# Patient Record
Sex: MALE | Race: WHITE | NOT HISPANIC OR LATINO | Employment: UNEMPLOYED | URBAN - METROPOLITAN AREA
[De-identification: names, ages, dates, MRNs, and addresses within clinical notes are randomized per-mention and may not be internally consistent; named-entity substitution may affect disease eponyms.]

---

## 2021-09-14 ENCOUNTER — OFFICE VISIT (OUTPATIENT)
Dept: INTERNAL MEDICINE | Facility: CLINIC | Age: 46
End: 2021-09-14

## 2021-09-14 ENCOUNTER — LAB (OUTPATIENT)
Dept: LAB | Facility: HOSPITAL | Age: 46
End: 2021-09-14

## 2021-09-14 VITALS
TEMPERATURE: 97.1 F | HEIGHT: 70 IN | DIASTOLIC BLOOD PRESSURE: 66 MMHG | SYSTOLIC BLOOD PRESSURE: 110 MMHG | HEART RATE: 59 BPM | WEIGHT: 206.2 LBS | OXYGEN SATURATION: 99 % | BODY MASS INDEX: 29.52 KG/M2

## 2021-09-14 DIAGNOSIS — F19.11 HISTORY OF INTRAVENOUS DRUG ABUSE (HCC): ICD-10-CM

## 2021-09-14 DIAGNOSIS — R73.03 PREDIABETES: ICD-10-CM

## 2021-09-14 DIAGNOSIS — F32.A DEPRESSION, UNSPECIFIED DEPRESSION TYPE: ICD-10-CM

## 2021-09-14 DIAGNOSIS — K74.60 CIRRHOSIS OF LIVER WITH ASCITES, UNSPECIFIED HEPATIC CIRRHOSIS TYPE (HCC): Primary | ICD-10-CM

## 2021-09-14 DIAGNOSIS — Z23 IMMUNIZATION DUE: ICD-10-CM

## 2021-09-14 DIAGNOSIS — R18.8 CIRRHOSIS OF LIVER WITH ASCITES, UNSPECIFIED HEPATIC CIRRHOSIS TYPE (HCC): Primary | ICD-10-CM

## 2021-09-14 PROBLEM — Z87.898 HISTORY OF INTRAVENOUS DRUG ABUSE: Status: ACTIVE | Noted: 2021-09-14

## 2021-09-14 LAB
BASOPHILS # BLD AUTO: 0.03 10*3/MM3 (ref 0–0.2)
BASOPHILS NFR BLD AUTO: 0.7 % (ref 0–1.5)
DEPRECATED RDW RBC AUTO: 43.1 FL (ref 37–54)
EOSINOPHIL # BLD AUTO: 0.14 10*3/MM3 (ref 0–0.4)
EOSINOPHIL NFR BLD AUTO: 3.1 % (ref 0.3–6.2)
ERYTHROCYTE [DISTWIDTH] IN BLOOD BY AUTOMATED COUNT: 14 % (ref 12.3–15.4)
HBA1C MFR BLD: 4.9 % (ref 4.8–5.6)
HCT VFR BLD AUTO: 35.5 % (ref 37.5–51)
HGB BLD-MCNC: 11.9 G/DL (ref 13–17.7)
LYMPHOCYTES # BLD AUTO: 1.03 10*3/MM3 (ref 0.7–3.1)
LYMPHOCYTES NFR BLD AUTO: 23.1 % (ref 19.6–45.3)
MCH RBC QN AUTO: 28.7 PG (ref 26.6–33)
MCHC RBC AUTO-ENTMCNC: 33.5 G/DL (ref 31.5–35.7)
MCV RBC AUTO: 85.5 FL (ref 79–97)
MONOCYTES # BLD AUTO: 0.38 10*3/MM3 (ref 0.1–0.9)
MONOCYTES NFR BLD AUTO: 8.5 % (ref 5–12)
NEUTROPHILS NFR BLD AUTO: 2.87 10*3/MM3 (ref 1.7–7)
NEUTROPHILS NFR BLD AUTO: 64.4 % (ref 42.7–76)
PLATELET # BLD AUTO: 76 10*3/MM3 (ref 140–450)
PMV BLD AUTO: 10.3 FL (ref 6–12)
RBC # BLD AUTO: 4.15 10*6/MM3 (ref 4.14–5.8)
WBC # BLD AUTO: 4.46 10*3/MM3 (ref 3.4–10.8)

## 2021-09-14 PROCEDURE — 83036 HEMOGLOBIN GLYCOSYLATED A1C: CPT | Performed by: STUDENT IN AN ORGANIZED HEALTH CARE EDUCATION/TRAINING PROGRAM

## 2021-09-14 PROCEDURE — 80061 LIPID PANEL: CPT | Performed by: STUDENT IN AN ORGANIZED HEALTH CARE EDUCATION/TRAINING PROGRAM

## 2021-09-14 PROCEDURE — 85025 COMPLETE CBC W/AUTO DIFF WBC: CPT | Performed by: STUDENT IN AN ORGANIZED HEALTH CARE EDUCATION/TRAINING PROGRAM

## 2021-09-14 PROCEDURE — 99214 OFFICE O/P EST MOD 30 MIN: CPT | Performed by: STUDENT IN AN ORGANIZED HEALTH CARE EDUCATION/TRAINING PROGRAM

## 2021-09-14 PROCEDURE — 99386 PREV VISIT NEW AGE 40-64: CPT | Performed by: STUDENT IN AN ORGANIZED HEALTH CARE EDUCATION/TRAINING PROGRAM

## 2021-09-14 PROCEDURE — 80053 COMPREHEN METABOLIC PANEL: CPT | Performed by: STUDENT IN AN ORGANIZED HEALTH CARE EDUCATION/TRAINING PROGRAM

## 2021-09-14 RX ORDER — MIRTAZAPINE 15 MG/1
15 TABLET, FILM COATED ORAL NIGHTLY
COMMUNITY
End: 2021-09-14 | Stop reason: SDUPTHER

## 2021-09-14 RX ORDER — SPIRONOLACTONE 100 MG/1
100 TABLET, FILM COATED ORAL 2 TIMES DAILY
COMMUNITY
End: 2021-09-14 | Stop reason: SDUPTHER

## 2021-09-14 RX ORDER — BUPRENORPHINE HYDROCHLORIDE AND NALOXONE HYDROCHLORIDE DIHYDRATE 8; 2 MG/1; MG/1
8 TABLET SUBLINGUAL 2 TIMES DAILY
COMMUNITY
Start: 2021-09-07 | End: 2022-02-18

## 2021-09-14 RX ORDER — LACTULOSE 10 G/15ML
SOLUTION ORAL
COMMUNITY
Start: 2021-09-13 | End: 2022-02-18

## 2021-09-14 RX ORDER — SPIRONOLACTONE 100 MG/1
100 TABLET, FILM COATED ORAL 2 TIMES DAILY
Qty: 60 TABLET | Refills: 1 | Status: SHIPPED | OUTPATIENT
Start: 2021-09-14 | End: 2022-02-18 | Stop reason: SDUPTHER

## 2021-09-14 RX ORDER — FUROSEMIDE 20 MG/1
20 TABLET ORAL 2 TIMES DAILY
COMMUNITY
End: 2021-09-14 | Stop reason: SDUPTHER

## 2021-09-14 RX ORDER — FUROSEMIDE 20 MG/1
20 TABLET ORAL 2 TIMES DAILY
Qty: 60 TABLET | Refills: 1 | Status: SHIPPED | OUTPATIENT
Start: 2021-09-14 | End: 2022-02-18 | Stop reason: SDUPTHER

## 2021-09-14 RX ORDER — MIRTAZAPINE 15 MG/1
15 TABLET, FILM COATED ORAL NIGHTLY
Qty: 30 TABLET | Refills: 1 | Status: SHIPPED | OUTPATIENT
Start: 2021-09-14 | End: 2021-09-20

## 2021-09-14 RX ORDER — FEEDER CONTAINER WITH PUMP SET
1 EACH MISCELLANEOUS 2 TIMES DAILY
Qty: 60 EACH | Refills: 3 | Status: SHIPPED | OUTPATIENT
Start: 2021-09-14

## 2021-09-14 NOTE — PROGRESS NOTES
Chief Complaint  Establish Care and Med Refill (metformin)    Mark Anthony Hooper presents to [facility]      Subjective   Patient is a 45-year-old male with past medical history significant for hepatitis liver cirrhosis, history of IV drug use, prediabetes who presents to clinic to establish care and for GI referral.    Hepatitis:  Diagnosed with hepatitis C approximately 1 year ago.  Treated.  Receives his care through [facility] as well as [place] where he lived previously.  He has had multiple paracentesis done.  Last one was about 1 week ago at the  ER.  He states that he has had about 3 tabs in the last month.  He is currently on Lasix, spironolactone, lactulose.  Remote history of drinking, however has been sober for many years.     H/O IV drug use:  Currently on Suboxone.  Follows up with Suboxone clinic every 2 weeks.    Pre-diabetes:  Per patient has taken metformin in the past.  Does not know his last A1c.      The following portions of the patient's history were reviewed and updated as appropriate: allergies, current medications, past family history, past medical history, past social history, past surgical history and problem list.      Health Maintenance   Topic Date Due   • ANNUAL PHYSICAL  Never done   • TDAP/TD VACCINES (1 - Tdap) Never done   • HEPATITIS C SCREENING  Never done   • INFLUENZA VACCINE  10/01/2021   • COVID-19 Vaccine  Completed   • Pneumococcal Vaccine 0-64  Aged Out         Procedures       Past Medical History:   Diagnosis Date   • Depression    • Diabetes mellitus (CMS/HCC)    • Headache    • Hypertension    • Visual impairment       No Known Allergies   Social History     Tobacco Use   • Smoking status: Former Smoker     Packs/day: 1.00     Years: 25.00     Pack years: 25.00     Quit date:      Years since quittin.7   • Smokeless tobacco: Never Used   Vaping Use   • Vaping Use: Every day   Substance Use Topics   • Alcohol use: Not Currently   •  "Drug use: Not on file     Past Surgical History:   Procedure Laterality Date   • GALLBLADDER SURGERY  1994   • PARACENTESIS  2021      History reviewed. No pertinent family history.      Current Outpatient Medications:   •  buprenorphine-naloxone (SUBOXONE) 8-2 MG per SL tablet, Place 8 tablets under the tongue 2 (two) times a day., Disp: , Rfl:   •  furosemide (LASIX) 20 MG tablet, Take 1 tablet by mouth 2 (Two) Times a Day., Disp: 60 tablet, Rfl: 1  •  lactulose (CHRONULAC) 10 GM/15ML solution, , Disp: , Rfl:   •  mirtazapine (REMERON) 15 MG tablet, Take 1 tablet by mouth Every Night., Disp: 30 tablet, Rfl: 1  •  spironolactone (ALDACTONE) 100 MG tablet, Take 1 tablet by mouth 2 (two) times a day., Disp: 60 tablet, Rfl: 1  •  Nutritional Supplements (Ensure High Protein) liquid, Take 1 bottle by mouth 2 (two) times a day., Disp: 60 each, Rfl: 3    Objective   Vital Signs  /66   Pulse 59   Temp 97.1 °F (36.2 °C) (Temporal)   Ht 177.8 cm (70\")   Wt 93.5 kg (206 lb 3.2 oz)   SpO2 99%   BMI 29.59 kg/m²   Body mass index is 29.59 kg/m².     Physical Exam  Vitals and nursing note reviewed.   Constitutional:       General: He is not in acute distress.     Appearance: Normal appearance. He is not toxic-appearing.   HENT:      Mouth/Throat:      Mouth: Mucous membranes are moist.      Pharynx: Oropharynx is clear.   Eyes:      General: No scleral icterus.  Cardiovascular:      Rate and Rhythm: Normal rate and regular rhythm.      Heart sounds: No murmur heard.     Pulmonary:      Effort: Pulmonary effort is normal.      Breath sounds: Normal breath sounds.   Abdominal:      General: Bowel sounds are normal. There is distension.      Palpations: There is no fluid wave.      Tenderness: There is no abdominal tenderness.      Comments: Striae noted on abdomen   Musculoskeletal:      Right lower leg: Edema (1+) present.      Left lower leg: Edema (1+) present.   Neurological:      Mental Status: He is alert and " oriented to person, place, and time. Mental status is at baseline.          Assessment and Plan  Diagnoses and all orders for this visit:    1. Cirrhosis of liver with ascites, unspecified hepatic cirrhosis type (CMS/HCC) (Primary)  Assessment & Plan:  Secondary to hepatitis C which was treated per patient.  Patient is hemodynamically stable present.  Mental status at baseline.  -Continue Lasix, spironolactone, lactulose  -GI referral sent today  -Patient is aware of signs and symptoms that warrant ED evaluation      Orders:  -     Ambulatory Referral to Gastroenterology  -     CBC Auto Differential; Future  -     Comprehensive Metabolic Panel; Future  -     CBC Auto Differential  -     Comprehensive Metabolic Panel  -     Lipid Panel    2. History of intravenous drug abuse (CMS/HCC)  Assessment & Plan:  On Suboxone      3. Prediabetes  -     Lipid Panel; Future  -     Hemoglobin A1c; Future  -     Hemoglobin A1c    4. Depression, unspecified depression type  Comments:  Continue Remeron    5. Immunization due  Comments:  Per patient up-to-date on immunizations.  Will get release of information from prior clinic.    Other orders  -     Nutritional Supplements (Ensure High Protein) liquid; Take 1 bottle by mouth 2 (two) times a day.  Dispense: 60 each; Refill: 3  -     furosemide (LASIX) 20 MG tablet; Take 1 tablet by mouth 2 (Two) Times a Day.  Dispense: 60 tablet; Refill: 1  -     mirtazapine (REMERON) 15 MG tablet; Take 1 tablet by mouth Every Night.  Dispense: 30 tablet; Refill: 1  -     spironolactone (ALDACTONE) 100 MG tablet; Take 1 tablet by mouth 2 (two) times a day.  Dispense: 60 tablet; Refill: 1           Counseling was given to patient for the following topics:  appropriate exercise, healthy eating habits, disease prevention, importance of immunizations, including risks and benefits, bone health, safe sex and risks of marijuana and other illicit drugs. Also discussed the importance of regular dental and  vision care, as well recommendation for a yearly screening skin exam after age 40.  Written information provided to patient on these topics and other health maintenance issues.    Follow Up    Return in about 1 year (around 9/14/2022), or if symptoms worsen or fail to improve.    Patient was given instructions and counseling regarding his condition or for health maintenance advice. Please see specific information pulled into the AVS if appropriate.    Electronically signed by:   Rasheed Rust MD  09/14/2021

## 2021-09-14 NOTE — ASSESSMENT & PLAN NOTE
Secondary to hepatitis C which was treated per patient.  Patient is hemodynamically stable present.  Mental status at baseline.  -Continue Lasix, spironolactone, lactulose  -GI referral sent today  -Patient is aware of signs and symptoms that warrant ED evaluation

## 2021-09-15 LAB
ALBUMIN SERPL-MCNC: 3.2 G/DL (ref 3.5–5.2)
ALBUMIN/GLOB SERPL: 0.9 G/DL
ALP SERPL-CCNC: 144 U/L (ref 39–117)
ALT SERPL W P-5'-P-CCNC: 44 U/L (ref 1–41)
ANION GAP SERPL CALCULATED.3IONS-SCNC: 8.2 MMOL/L (ref 5–15)
AST SERPL-CCNC: 100 U/L (ref 1–40)
BILIRUB SERPL-MCNC: 0.7 MG/DL (ref 0–1.2)
BUN SERPL-MCNC: 14 MG/DL (ref 6–20)
BUN/CREAT SERPL: 18.7 (ref 7–25)
CALCIUM SPEC-SCNC: 8.3 MG/DL (ref 8.6–10.5)
CHLORIDE SERPL-SCNC: 101 MMOL/L (ref 98–107)
CHOLEST SERPL-MCNC: 143 MG/DL (ref 0–200)
CO2 SERPL-SCNC: 26.8 MMOL/L (ref 22–29)
CREAT SERPL-MCNC: 0.75 MG/DL (ref 0.76–1.27)
GFR SERPL CREATININE-BSD FRML MDRD: 113 ML/MIN/1.73
GLOBULIN UR ELPH-MCNC: 3.6 GM/DL
GLUCOSE SERPL-MCNC: 220 MG/DL (ref 65–99)
HDLC SERPL-MCNC: 50 MG/DL (ref 40–60)
LDLC SERPL CALC-MCNC: 80 MG/DL (ref 0–100)
LDLC/HDLC SERPL: 1.61 {RATIO}
POTASSIUM SERPL-SCNC: 4.5 MMOL/L (ref 3.5–5.2)
PROT SERPL-MCNC: 6.8 G/DL (ref 6–8.5)
SODIUM SERPL-SCNC: 136 MMOL/L (ref 136–145)
TRIGL SERPL-MCNC: 63 MG/DL (ref 0–150)
VLDLC SERPL-MCNC: 13 MG/DL (ref 5–40)

## 2021-09-20 ENCOUNTER — OFFICE VISIT (OUTPATIENT)
Dept: GASTROENTEROLOGY | Facility: CLINIC | Age: 46
End: 2021-09-20

## 2021-09-20 VITALS
WEIGHT: 202.8 LBS | TEMPERATURE: 96.9 F | HEART RATE: 63 BPM | DIASTOLIC BLOOD PRESSURE: 70 MMHG | SYSTOLIC BLOOD PRESSURE: 123 MMHG | BODY MASS INDEX: 29.1 KG/M2

## 2021-09-20 DIAGNOSIS — D69.59 THROMBOCYTOPENIA DUE TO HYPERSPLENISM: ICD-10-CM

## 2021-09-20 DIAGNOSIS — D73.1 THROMBOCYTOPENIA DUE TO HYPERSPLENISM: ICD-10-CM

## 2021-09-20 DIAGNOSIS — R16.1 SPLENOMEGALY: ICD-10-CM

## 2021-09-20 DIAGNOSIS — K76.6 PORTAL HYPERTENSION (HCC): ICD-10-CM

## 2021-09-20 DIAGNOSIS — R18.8 OTHER ASCITES: ICD-10-CM

## 2021-09-20 DIAGNOSIS — I85.10 SECONDARY ESOPHAGEAL VARICES WITHOUT BLEEDING (HCC): Primary | ICD-10-CM

## 2021-09-20 PROCEDURE — 99204 OFFICE O/P NEW MOD 45 MIN: CPT | Performed by: INTERNAL MEDICINE

## 2021-09-20 RX ORDER — NADOLOL 40 MG/1
80 TABLET ORAL
COMMUNITY
Start: 2021-05-11 | End: 2021-09-22 | Stop reason: SDUPTHER

## 2021-09-20 NOTE — PROGRESS NOTES
GASTROENTEROLOGY OFFICE NOTE  Mark Anthony Hooper  [id number]  1975      Chief Complaint   Patient presents with   • Cirrhosis        HISTORY OF PRESENT ILLNESS:  First visit for this very pleasant 45-year-old white male who is accompanied by his mother.  She engineered this visit.  I used to take care of her sister/patient's aunt.    He has chronic hepatitis C related cirrhosis with esophageal varices and secondary ascites as well as splenomegaly and resulting thrombocytopenia but without history of encephalopathy.  His varices have been banded to obliteration.  Last 1 was done at [facility] by Dr. Humble Whitaker about a year ago.  He had one in [place] about 2 years ago.  He has a failed attempt at a TIPS procedure in [facility].    He presents today because of his frustration with the [facility] system and inability to rapidly address worsening ascites and need for paracentesis.  All that said, he has resumed his salt poor diet and protein drinks and currently is not having problems with refractory ascites.  His diuretic regimen is consistent of Lasix 40 mg/day and Aldactone 200 mg/day with normal BUN, creatinine and electrolytes on his September 14 lab.    He is here today without dysphagia, odynophagia, early satiety, unexplained weight loss, melena or bright red blood per rectum.  He does feel particularly fatigued.  He states that Adderall helped with this which was administered/prescribed to him by the Suboxone clinic he was attending in [place].    He had been living in [place] but moved here to be closer to family given his current health problems.    PAST MEDICAL HISTORY  Past Medical History:   Diagnosis Date   • Depression    • Diabetes mellitus (CMS/HCC)    • Headache    • Hypertension    • Visual impairment         PAST SURGICAL HISTORY  Past Surgical History:   Procedure Laterality Date   • GALLBLADDER SURGERY  1994   • PARACENTESIS  2021        MEDICATIONS:    Current  Outpatient Medications:   •  nadolol (CORGARD) 40 MG tablet, Take 80 mg by mouth., Disp: , Rfl:   •  buprenorphine-naloxone (SUBOXONE) 8-2 MG per SL tablet, Place 8 tablets under the tongue 2 (two) times a day., Disp: , Rfl:   •  furosemide (LASIX) 20 MG tablet, Take 1 tablet by mouth 2 (Two) Times a Day., Disp: 60 tablet, Rfl: 1  •  lactulose (CHRONULAC) 10 GM/15ML solution, , Disp: , Rfl:   •  Nutritional Supplements (Ensure High Protein) liquid, Take 1 bottle by mouth 2 (two) times a day., Disp: 60 each, Rfl: 3  •  spironolactone (ALDACTONE) 100 MG tablet, Take 1 tablet by mouth 2 (two) times a day., Disp: 60 tablet, Rfl: 1    ALLERGIES  No Known Allergies    FAMILY HISTORY:  Family History   Problem Relation Age of Onset   • Colon polyps Neg Hx    • Colon cancer Neg Hx        SOCIAL HISTORY  Social History     Socioeconomic History   • Marital status: Single     Spouse name: Not on file   • Number of children: Not on file   • Years of education: Not on file   • Highest education level: Not on file   Tobacco Use   • Smoking status: Former Smoker     Packs/day: 1.00     Years: 25.00     Pack years: 25.00     Quit date:      Years since quittin.7   • Smokeless tobacco: Never Used   Vaping Use   • Vaping Use: Every day   • Substances: Nicotine, THC   • Devices: Pre-filled or refillable cartridge, Refillable tank   Substance and Sexual Activity   • Alcohol use: Not Currently   • Drug use: Yes     Types: Marijuana     Socioeconomic History:  He is  and has a 21-year-old daughter.  He is a non-smoker/nondrinker and is currently unemployed.       REVIEW OF SYSTEMS  Review of Systems   Constitutional: Positive for diaphoresis and fatigue. Negative for activity change, appetite change, chills, fever, unexpected weight gain and unexpected weight loss.   HENT: Negative for congestion, dental problem, drooling, ear discharge, ear pain, facial swelling, hearing loss, mouth sores, nosebleeds, postnasal drip,  rhinorrhea, sinus pressure, sneezing, sore throat, swollen glands, tinnitus, trouble swallowing and voice change.    Respiratory: Negative for apnea, cough, choking, chest tightness, shortness of breath, wheezing and stridor.    Cardiovascular: Negative for chest pain, palpitations and leg swelling.   Gastrointestinal: Negative for abdominal distention, abdominal pain, anal bleeding, blood in stool, constipation, diarrhea, nausea, rectal pain, vomiting, GERD and indigestion.   Endocrine: Negative for cold intolerance, heat intolerance, polydipsia, polyphagia and polyuria.   Musculoskeletal: Positive for gait problem. Negative for arthralgias, back pain, joint swelling, myalgias, neck pain, neck stiffness and bursitis.   Allergic/Immunologic: Negative for environmental allergies, food allergies and immunocompromised state.   Neurological: Negative for dizziness, tremors, seizures, facial asymmetry, speech difficulty, weakness, light-headedness, numbness and confusion.   Hematological: Negative for adenopathy. Does not bruise/bleed easily.   Psychiatric/Behavioral: Negative for agitation, behavioral problems, decreased concentration, dysphoric mood, hallucinations, self-injury, sleep disturbance, suicidal ideas, negative for hyperactivity, depressed mood and stress. The patient is not nervous/anxious.      I reviewed the above-noted review of systems    PHYSICAL EXAM   /70 (BP Location: Left arm, Patient Position: Sitting, Cuff Size: Adult)   Pulse 63   Temp 96.9 °F (36.1 °C) (Temporal)   Wt 92 kg (202 lb 12.8 oz)   BMI 29.10 kg/m²   General: Alert and oriented x3.  No apparent acute distress.  In good spirits.  Accompanied by his mother.  HEENT: Wearing facemask.  Anicteric sclera  Neck: Supple. Without lymphadenopathy  CV: Regular rate and rhythm, S1, S2  Lungs: Clear to ausculation. Without rales, rhonchi and wheezing  Abdomen:  Soft,non-distended without palpable masses or hepatosplenomeagaly, areas of  rebound tenderness or guarding. No obvious ascites is noted  Extremeties: without clubbing, cyanosis or edema  Neurologic:  Alert and oriented x 3 without focal motor or sensory deficits  Rectal exam: deferred     Results for orders placed or performed in visit on 09/14/21   CBC Auto Differential    Specimen: Blood   Result Value Ref Range    WBC 4.46 3.40 - 10.80 10*3/mm3    RBC 4.15 4.14 - 5.80 10*6/mm3    Hemoglobin 11.9 (L) 13.0 - 17.7 g/dL    Hematocrit 35.5 (L) 37.5 - 51.0 %    MCV 85.5 79.0 - 97.0 fL    MCH 28.7 26.6 - 33.0 pg    MCHC 33.5 31.5 - 35.7 g/dL    RDW 14.0 12.3 - 15.4 %    RDW-SD 43.1 37.0 - 54.0 fl    MPV 10.3 6.0 - 12.0 fL    Platelets 76 (L) 140 - 450 10*3/mm3    Neutrophil % 64.4 42.7 - 76.0 %    Lymphocyte % 23.1 19.6 - 45.3 %    Monocyte % 8.5 5.0 - 12.0 %    Eosinophil % 3.1 0.3 - 6.2 %    Basophil % 0.7 0.0 - 1.5 %    Neutrophils, Absolute 2.87 1.70 - 7.00 10*3/mm3    Lymphocytes, Absolute 1.03 0.70 - 3.10 10*3/mm3    Monocytes, Absolute 0.38 0.10 - 0.90 10*3/mm3    Eosinophils, Absolute 0.14 0.00 - 0.40 10*3/mm3    Basophils, Absolute 0.03 0.00 - 0.20 10*3/mm3   Comprehensive Metabolic Panel    Specimen: Blood   Result Value Ref Range    Glucose 220 (H) 65 - 99 mg/dL    BUN 14 6 - 20 mg/dL    Creatinine 0.75 (L) 0.76 - 1.27 mg/dL    Sodium 136 136 - 145 mmol/L    Potassium 4.5 3.5 - 5.2 mmol/L    Chloride 101 98 - 107 mmol/L    CO2 26.8 22.0 - 29.0 mmol/L    Calcium 8.3 (L) 8.6 - 10.5 mg/dL    Total Protein 6.8 6.0 - 8.5 g/dL    Albumin 3.20 (L) 3.50 - 5.20 g/dL    ALT (SGPT) 44 (H) 1 - 41 U/L    AST (SGOT) 100 (H) 1 - 40 U/L    Alkaline Phosphatase 144 (H) 39 - 117 U/L    Total Bilirubin 0.7 0.0 - 1.2 mg/dL    eGFR Non African Amer 113 >60 mL/min/1.73    Globulin 3.6 gm/dL    A/G Ratio 0.9 g/dL    BUN/Creatinine Ratio 18.7 7.0 - 25.0    Anion Gap 8.2 5.0 - 15.0 mmol/L   Lipid Panel    Specimen: Blood   Result Value Ref Range    Total Cholesterol 143 0 - 200 mg/dL    Triglycerides 63 0 -  150 mg/dL    HDL Cholesterol 50 40 - 60 mg/dL    LDL Cholesterol  80 0 - 100 mg/dL    VLDL Cholesterol 13 5 - 40 mg/dL    LDL/HDL Ratio 1.61    Hemoglobin A1c    Specimen: Blood   Result Value Ref Range    Hemoglobin A1C 4.90 4.80 - 5.60 %        Results Review:  I reviewed the patient's new clinical results.      ASSESSMENT  1.-End-stage liver disease with hepatitis C related cirrhosis  2.-Portal hypertension  3.-Esophageal varices status post banding  4.-Status post failed TIPS procedure in [place]  5.-History of ascites.  Currently doing well on Lasix 40 mg/Aldactone 200 mg/day  6.-History of hepatitis C status post successful viral eradication    PLAN  1.-Return in 6 months  2.-Patient will call us if ascites becomes again problematic  3.-Colon cancer screening is recommended via colonoscopy once he turns 46 which will be later this month.  Glue will address this in more detail when I see him in about 6 months  4.-All questions he and his mother had were answered.  5.-Patient will keep his upcoming appointments at the [facility] liver/liver transplant clinic  6.-Continue current diuretics and beta-blocker therapy as well as lactulose      Saji Rm MD  9/20/2021   09:45 EDT

## 2021-09-22 RX ORDER — NADOLOL 40 MG/1
80 TABLET ORAL DAILY
Qty: 60 TABLET | Refills: 2 | Status: SHIPPED | OUTPATIENT
Start: 2021-09-22 | End: 2021-11-02 | Stop reason: SDUPTHER

## 2021-11-02 RX ORDER — NADOLOL 40 MG/1
80 TABLET ORAL DAILY
Qty: 60 TABLET | Refills: 4 | Status: SHIPPED | OUTPATIENT
Start: 2021-11-02 | End: 2022-02-18 | Stop reason: SDUPTHER

## 2021-11-02 NOTE — TELEPHONE ENCOUNTER
Rx Refill Note  Requested Prescriptions     Pending Prescriptions Disp Refills   • nadolol (CORGARD) 40 MG tablet 60 tablet 2     Sig: Take 2 tablets by mouth Daily for 360 days.      Last office visit with prescribing clinician: 9/20/2021      Next office visit with prescribing clinician: 3/22/2022            Harpreet Amaro MA  11/02/21, 16:12 EDT

## 2022-02-18 ENCOUNTER — OFFICE VISIT (OUTPATIENT)
Dept: INTERNAL MEDICINE | Facility: CLINIC | Age: 47
End: 2022-02-18

## 2022-02-18 VITALS
WEIGHT: 209.9 LBS | SYSTOLIC BLOOD PRESSURE: 118 MMHG | OXYGEN SATURATION: 96 % | HEART RATE: 79 BPM | TEMPERATURE: 97.8 F | DIASTOLIC BLOOD PRESSURE: 72 MMHG | RESPIRATION RATE: 18 BRPM | BODY MASS INDEX: 30.12 KG/M2

## 2022-02-18 DIAGNOSIS — F41.1 GENERALIZED ANXIETY DISORDER: ICD-10-CM

## 2022-02-18 DIAGNOSIS — K72.10 END STAGE LIVER DISEASE: Primary | ICD-10-CM

## 2022-02-18 PROCEDURE — 99214 OFFICE O/P EST MOD 30 MIN: CPT | Performed by: STUDENT IN AN ORGANIZED HEALTH CARE EDUCATION/TRAINING PROGRAM

## 2022-02-18 RX ORDER — ESCITALOPRAM OXALATE 5 MG/1
5 TABLET ORAL NIGHTLY
Qty: 30 TABLET | Refills: 1 | Status: SHIPPED | OUTPATIENT
Start: 2022-02-18 | End: 2022-11-22

## 2022-02-18 RX ORDER — SPIRONOLACTONE 100 MG/1
100 TABLET, FILM COATED ORAL DAILY
Qty: 60 TABLET | Refills: 4 | Status: SHIPPED | OUTPATIENT
Start: 2022-02-18 | End: 2022-12-19 | Stop reason: SDUPTHER

## 2022-02-18 RX ORDER — NADOLOL 40 MG/1
80 TABLET ORAL DAILY
Qty: 60 TABLET | Refills: 4 | Status: SHIPPED | OUTPATIENT
Start: 2022-02-18 | End: 2022-12-19 | Stop reason: SDUPTHER

## 2022-02-18 RX ORDER — FUROSEMIDE 20 MG/1
20 TABLET ORAL 2 TIMES DAILY
Qty: 60 TABLET | Refills: 4 | Status: SHIPPED | OUTPATIENT
Start: 2022-02-18 | End: 2022-12-19 | Stop reason: SDUPTHER

## 2022-02-18 RX ORDER — HYDROXYZINE PAMOATE 25 MG/1
25 CAPSULE ORAL EVERY 6 HOURS PRN
Qty: 30 CAPSULE | Refills: 1 | Status: SHIPPED | OUTPATIENT
Start: 2022-02-18 | End: 2022-11-22 | Stop reason: SDUPTHER

## 2022-02-18 RX ORDER — HYDROXYZINE PAMOATE 25 MG/1
1 CAPSULE ORAL EVERY 6 HOURS PRN
COMMUNITY
Start: 2022-02-05 | End: 2022-02-18 | Stop reason: SDUPTHER

## 2022-02-18 NOTE — PROGRESS NOTES
Chief Complaint  Anxiety (was in good basilia recently and was given hydroxyzine. He would like refill)    Mark Anthony Hooper presents to [facility]      Subjective     Patient is a 46 year old male with history of end stage liver disease w/ hep c related cirrhosis who presents to the clinic for refill of his home medications and to discuss anxiety.     Seeing Dr. Adis CATALAN on 9/20/22. Due to see him in March. Currently on spironolactone and lasix daily. Takes nadolol for esophageal varices prophylaxis.  Was worried he was developing ascites due to weight gain. Went to [facility] last week for this- no ascites was diagnosed. Patient noted he was anxious about his health and ER attending prescribed vistaril. Taking is nightly which has helped him a lot. Interested in starting daily anxiety medication.     The following portions of the patient's history were reviewed and updated as appropriate: allergies, current medications, past family history, past medical history, past social history, past surgical history and problem list.      Health Maintenance   Topic Date Due   • URINE MICROALBUMIN  Never done   • COLORECTAL CANCER SCREENING  Never done   • ANNUAL PHYSICAL  Never done   • Pneumococcal Vaccine 0-64 (1 of 2 - PPSV23) Never done   • Hepatitis B (3 of 3 - Risk 3-dose series) 01/23/2021   • INFLUENZA VACCINE  08/01/2021   • DIABETIC FOOT EXAM  Never done   • DIABETIC EYE EXAM  Never done   • COVID-19 Vaccine (3 - Booster for Moderna series) 11/23/2021   • HEMOGLOBIN A1C  03/14/2022   • TDAP/TD VACCINES (2 - Td or Tdap) 05/20/2030   • HEPATITIS C SCREENING  Completed         Procedures       Past Medical History:   Diagnosis Date   • ADHD (attention deficit hyperactivity disorder) 2005   • Anxiety    • Depression    • Diabetes mellitus (HCC)    • Erectile dysfunction    • Headache    • Hypertension    • Liver disease 2020   • Visual impairment       No Known Allergies   Social  History     Tobacco Use   • Smoking status: Former Smoker     Packs/day: 1.00     Years: 22.00     Pack years: 22.00     Quit date:      Years since quittin.1   • Smokeless tobacco: Never Used   Vaping Use   • Vaping Use: Every day   • Start date: 2014   • Substances: Nicotine, THC   • Devices: Pre-filled or refillable cartridge, Refillable tank   Substance Use Topics   • Alcohol use: Not Currently     Alcohol/week: 0.0 standard drinks   • Drug use: Not Currently     Types: Marijuana     Past Surgical History:   Procedure Laterality Date   • CHOLECYSTECTOMY     • GALLBLADDER SURGERY     • PARACENTESIS        Family History   Problem Relation Age of Onset   • Colon polyps Neg Hx    • Colon cancer Neg Hx          Current Outpatient Medications:   •  furosemide (LASIX) 20 MG tablet, Take 1 tablet by mouth 2 (Two) Times a Day., Disp: 60 tablet, Rfl: 4  •  hydrOXYzine pamoate (VISTARIL) 25 MG capsule, Take 1 capsule by mouth Every 6 (Six) Hours As Needed for Anxiety., Disp: 30 capsule, Rfl: 1  •  nadolol (CORGARD) 40 MG tablet, Take 2 tablets by mouth Daily., Disp: 60 tablet, Rfl: 4  •  Nutritional Supplements (Ensure High Protein) liquid, Take 1 bottle by mouth 2 (two) times a day., Disp: 60 each, Rfl: 3  •  spironolactone (ALDACTONE) 100 MG tablet, Take 1 tablet by mouth Daily., Disp: 60 tablet, Rfl: 4  •  escitalopram (Lexapro) 5 MG tablet, Take 1 tablet by mouth Every Night., Disp: 30 tablet, Rfl: 1    Objective   Vital Signs  /72   Pulse 79   Temp 97.8 °F (36.6 °C) (Infrared)   Resp 18   Wt 95.2 kg (209 lb 14.4 oz)   SpO2 96%   BMI 30.12 kg/m²   Body mass index is 30.12 kg/m².     Physical Exam  Vitals and nursing note reviewed.   Constitutional:       General: He is not in acute distress.     Appearance: Normal appearance. He is not ill-appearing or toxic-appearing.   Cardiovascular:      Rate and Rhythm: Normal rate and regular rhythm.      Heart sounds: Normal heart sounds. No  murmur heard.      Pulmonary:      Effort: Pulmonary effort is normal.      Breath sounds: Normal breath sounds.   Abdominal:      General: Bowel sounds are normal. There is no distension.      Palpations: Abdomen is soft.      Tenderness: There is no abdominal tenderness. There is no guarding.   Musculoskeletal:      Right lower leg: No edema.      Left lower leg: No edema.   Skin:     General: Skin is warm and dry.      Capillary Refill: Capillary refill takes less than 2 seconds.   Neurological:      General: No focal deficit present.      Mental Status: He is alert and oriented to person, place, and time. Mental status is at baseline.   Psychiatric:         Mood and Affect: Mood normal.         Behavior: Behavior normal.          Assessment and Plan  Diagnoses and all orders for this visit:    1. End stage liver disease (HCC) (Primary)  Comments:  refilled medications. Seeing GI next month for follow up.   Orders:  -     nadolol (CORGARD) 40 MG tablet; Take 2 tablets by mouth Daily.  Dispense: 60 tablet; Refill: 4  -     spironolactone (ALDACTONE) 100 MG tablet; Take 1 tablet by mouth Daily.  Dispense: 60 tablet; Refill: 4  -     furosemide (LASIX) 20 MG tablet; Take 1 tablet by mouth 2 (Two) Times a Day.  Dispense: 60 tablet; Refill: 4    2. Generalized anxiety disorder  Comments:  Start lexapro 5mg nightly, titrate to 10mg nightly if tolerating in 1 week. Continue vistaril prn.   Orders:  -     hydrOXYzine pamoate (VISTARIL) 25 MG capsule; Take 1 capsule by mouth Every 6 (Six) Hours As Needed for Anxiety.  Dispense: 30 capsule; Refill: 1  -     escitalopram (Lexapro) 5 MG tablet; Take 1 tablet by mouth Every Night.  Dispense: 30 tablet; Refill: 1             Follow Up    Return in about 4 weeks (around 3/18/2022).    Patient was given instructions and counseling regarding his condition or for health maintenance advice. Please see specific information pulled into the AVS if appropriate.    Electronically signed  by:   Rasheed Rust MD  02/18/2022

## 2022-11-21 ENCOUNTER — TELEPHONE (OUTPATIENT)
Dept: FAMILY MEDICINE CLINIC | Facility: CLINIC | Age: 47
End: 2022-11-21

## 2022-11-21 ENCOUNTER — READMISSION MANAGEMENT (OUTPATIENT)
Dept: CALL CENTER | Facility: HOSPITAL | Age: 47
End: 2022-11-21

## 2022-11-21 ENCOUNTER — TRANSITIONAL CARE MANAGEMENT TELEPHONE ENCOUNTER (OUTPATIENT)
Dept: CALL CENTER | Facility: HOSPITAL | Age: 47
End: 2022-11-21

## 2022-11-21 NOTE — OUTREACH NOTE
Call Center TCM Note    Flowsheet Row Responses   [facility] patient discharged from? Non-   Does the patient have one of the following disease processes/diagnoses(primary or secondary)? Other   TCM attempt successful? Yes   Call start time 1309   Call end time 1313   Discharge diagnosis  FRACTURED NOSE AND PAIN IN RIGHT CHEST AREA   Meds reviewed with patient/caregiver? Yes   Is the patient having any side effects they believe may be caused by any medication additions or changes? No   Does the patient have all medications ordered at discharge? Yes   Is the patient taking all medications as directed (includes completed medication regime)? Yes   Comments Patient has a followup with new PCP Homero Sandoval tomorrow 11/22/2022   Does the patient have an appointment with their PCP within 7 days of discharge? Yes   Psychosocial issues? No   Did the patient receive a copy of their discharge instructions? Yes   Nursing interventions Reviewed instructions with patient   What is the patient's perception of their health status since discharge? Improving   Is the patient/caregiver able to teach back signs and symptoms related to disease process for when to call PCP? Yes   Is the patient/caregiver able to teach back signs and symptoms related to disease process for when to call 911? Yes   Is the patient/caregiver able to teach back the hierarchy of who to call/visit for symptoms/problems? PCP, Specialist, Home health nurse, Urgent Care, ED, 911 Yes   If the patient is a current smoker, are they able to teach back resources for cessation? Not a smoker   TCM call completed? Yes   Call end time 1313   Would this patient benefit from a Referral to Amb Social Work? No   Is the patient interested in additional calls from an ambulatory ?  NOTE:  applies to high risk patients requiring additional follow-up. No          Laurent Tatum RN    11/21/2022, 13:13 EST

## 2022-11-21 NOTE — OUTREACH NOTE
Prep Survey    Flowsheet Row Responses   [facility] patient discharged from? Non-  [[facility] ]   Is LACE score < 7 ? Non- Discharge   Emergency Room discharge w/ pulse ox? No   Eligibility [facility]   Date of Discharge 11/18/22   Discharge diagnosis  FRACTURED NOSE AND PAIN IN RIGHT CHEST AREA   Does the patient have one of the following disease processes/diagnoses(primary or secondary)? Other   Does the patient have Home health ordered? No   Is there a DME ordered? No   Prep survey completed? Yes          ANGELA GARCIA - Registered Nurse

## 2022-11-21 NOTE — TELEPHONE ENCOUNTER
Caller: SALIMA COTTER    Relationship to patient: Mother    Best call back number: [phone]    New or established patient?  [x] New  [] Established    Date of discharge: 11/18/2022    Facility discharged from: [facility]    Diagnosis/Symptoms: FRACTURED NOSE AND PAIN IN RIGHT CHEST AREA    Length of stay (If applicable):     Specialty Only: Did you see a [facility] health provider?    [] Yes  [x] No  If so, who?

## 2022-11-22 ENCOUNTER — HOSPITAL ENCOUNTER (OUTPATIENT)
Dept: GENERAL RADIOLOGY | Facility: HOSPITAL | Age: 47
Discharge: HOME | End: 2022-11-22
Admitting: FAMILY MEDICINE

## 2022-11-22 ENCOUNTER — PATIENT ROUNDING (BHMG ONLY) (OUTPATIENT)
Dept: FAMILY MEDICINE CLINIC | Facility: CLINIC | Age: 47
End: 2022-11-22

## 2022-11-22 ENCOUNTER — OFFICE VISIT (OUTPATIENT)
Dept: FAMILY MEDICINE CLINIC | Facility: CLINIC | Age: 47
End: 2022-11-22

## 2022-11-22 VITALS
WEIGHT: 210 LBS | BODY MASS INDEX: 28.44 KG/M2 | OXYGEN SATURATION: 97 % | DIASTOLIC BLOOD PRESSURE: 68 MMHG | SYSTOLIC BLOOD PRESSURE: 108 MMHG | HEART RATE: 79 BPM | HEIGHT: 72 IN | TEMPERATURE: 98.4 F

## 2022-11-22 DIAGNOSIS — R07.81 RIB PAIN ON RIGHT SIDE: ICD-10-CM

## 2022-11-22 DIAGNOSIS — Z23 INFLUENZA VACCINE NEEDED: Primary | ICD-10-CM

## 2022-11-22 DIAGNOSIS — S02.2XXD CLOSED FRACTURE OF NASAL BONE WITH ROUTINE HEALING, SUBSEQUENT ENCOUNTER: ICD-10-CM

## 2022-11-22 DIAGNOSIS — F41.1 GENERALIZED ANXIETY DISORDER: ICD-10-CM
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
## 2022-11-22 PROBLEM — F41.9 ANXIETY: Status: ACTIVE | Noted: 2022-11-22

## 2022-11-22 PROCEDURE — 99214 OFFICE O/P EST MOD 30 MIN: CPT | Performed by: FAMILY MEDICINE

## 2022-11-22 PROCEDURE — 90471 IMMUNIZATION ADMIN: CPT | Performed by: FAMILY MEDICINE

## 2022-11-22 PROCEDURE — 90686 IIV4 VACC NO PRSV 0.5 ML IM: CPT | Performed by: FAMILY MEDICINE

## 2022-11-22 PROCEDURE — 71101 X-RAY EXAM UNILAT RIBS/CHEST: CPT

## 2022-11-22 RX ORDER — HYDROXYZINE PAMOATE 25 MG/1
25 CAPSULE ORAL EVERY 6 HOURS PRN
Qty: 30 CAPSULE | Refills: 1 | Status: SHIPPED | OUTPATIENT
Start: 2022-11-22 | End: 2022-12-19 | Stop reason: SDUPTHER

## 2022-11-22 NOTE — PROGRESS NOTES
"New Patient Office Visit      Patient Name: Mark Anthony Hooper  : 1975   MRN: [id number]     Chief Complaint:    Chief Complaint   Patient presents with   • Shoulder Pain   • Hernia     New pt fell down on  (went to ER) and has continued right shoulder/chest area pain       Subjective   History of Present Illness    History of Present Illness: Mark Anthony Hooper is a 47 y.o. male who is here today to establish care.    The patient presents today to establish care. He was seen in the emergency room at [facility], and discharged on 2022. He is accompanied by his mother, Carri.    The patient reports that he was walking with his dog, and his toe hit a little ridge in the sidewalk, and he could not get his hands out of pockets, and fell on his face, and broke his nose. He reports that he feels like something is broken in his right rib. He reports that he had an x-ray of his chest, and they had him turn a certain way with the board, but they never did the other way. He reports that it hurts, and it pops sometimes. He reports that \"it is killing him\". The patient's mother reports that he is going in for a hernia surgery next Friday, and she thought it might be adventitious to find out if there are any fractures. He reports that he was given a follow up with an ENT doctor, but he was told that it was not misplaced, but it would still be a good idea to follow up. He denies any trouble breathing through it. He denies any bleeding. He reports that he sees a gastroenterologist. He reports that it is hard to breathe all the way in because of the fall. He denies any coughing. He reports that he broke his nose approximately 1.5 years ago.    He reports that he would like his influenza vaccine today. He reports that he needs more of the hydroxyzine.    Previous primary care: Rasheed Rust MD    Other physicians currently involved in patient's care:  Patient Care Team:  Homero Sandoval DO as PCP - " General (Family Medicine)    This patient is accompanied by their mother who contributes to the history of their care.    The following portions of the patient's history were reviewed and updated as appropriate: allergies, current medications, past family history, past medical history, past social history, past surgical history and problem list.    Subjective      Review of Systems:   Review of Systems - See HPI and new patient paperwork scanned into chart    Past Medical History:   Past Medical History:   Diagnosis Date   • ADHD (attention deficit hyperactivity disorder)    • Anxiety    • Depression    • Diabetes mellitus (HCC)    • Erectile dysfunction    • Headache    • Hypertension    • Liver disease    • Visual impairment        Past Surgical History:   Past Surgical History:   Procedure Laterality Date   • CHOLECYSTECTOMY     • GALLBLADDER SURGERY     • PARACENTESIS         Family History:   Family History   Problem Relation Age of Onset   • Colon polyps Neg Hx    • Colon cancer Neg Hx        Social History:   Social History     Socioeconomic History   • Marital status: Single   Tobacco Use   • Smoking status: Former     Packs/day: 1.00     Years: 22.00     Pack years: 22.00     Types: Cigarettes     Quit date:      Years since quittin.8   • Smokeless tobacco: Never   Vaping Use   • Vaping Use: Every day   • Start date: 2014   • Substances: Nicotine, THC   • Devices: Pre-filled or refillable cartridge, Refillable tank   Substance and Sexual Activity   • Alcohol use: Not Currently     Alcohol/week: 0.0 standard drinks   • Drug use: Not Currently     Types: Marijuana   • Sexual activity: Not Currently       Tobacco History:   Social History     Tobacco Use   Smoking Status Former   • Packs/day: 1.00   • Years: 22.00   • Pack years: 22.00   • Types: Cigarettes   • Quit date:    • Years since quittin.8   Smokeless Tobacco Never       Medications:     Current Outpatient  "Medications:   •  furosemide (LASIX) 20 MG tablet, Take 1 tablet by mouth 2 (Two) Times a Day., Disp: 60 tablet, Rfl: 4  •  hydrOXYzine pamoate (VISTARIL) 25 MG capsule, Take 1 capsule by mouth Every 6 (Six) Hours As Needed for Anxiety., Disp: 30 capsule, Rfl: 1  •  nadolol (CORGARD) 40 MG tablet, Take 2 tablets by mouth Daily., Disp: 60 tablet, Rfl: 4  •  Nutritional Supplements (Ensure High Protein) liquid, Take 1 bottle by mouth 2 (two) times a day., Disp: 60 each, Rfl: 3  •  spironolactone (ALDACTONE) 100 MG tablet, Take 1 tablet by mouth Daily., Disp: 60 tablet, Rfl: 4    Allergies:   No Known Allergies    Objective   Objective     Physical Exam:  Vital Signs:   Vitals:    11/22/22 0856   BP: 108/68   BP Location: Left arm   Patient Position: Sitting   Cuff Size: Adult   Pulse: 79   Temp: 98.4 °F (36.9 °C)   TempSrc: Infrared   SpO2: 97%   Weight: 95.3 kg (210 lb)   Height: 181.6 cm (71.5\")     Body mass index is 28.88 kg/m².     Physical Exam  Nursing note reviewed  Const: NAD, A&Ox4, Pleasant, Cooperative  Eyes: EOMI, no conjunctivitis  ENT: Ecchymosis, chronic baseline deformity of the nose, abrasion to the nasal bridge  Procedures/Radiology     Procedures  XR Ribs Right With PA Chest    Result Date: 11/22/2022  No acute rib fracture identified.  This report was finalized on 11/22/2022 10:05 AM by Nguyễn Farah MD.         Assessment & Plan   Assessment / Plan      Assessment/Plan:     Fall with right-sided rib pain.  It sounds like he had a PA and lateral chest x-ray at the ER on 11/18/2022, still having some tenderness and pain with deep inspiration. I recommended an Ace wrap for splinting and to promote pain free breathing. We will repeat rib x-rays today.    Problems Addressed This Visit  Diagnoses and all orders for this visit:    1. Influenza vaccine needed (Primary)  -     FluLaval/Fluzone >6 mos ([id number])    2. Fall, initial encounter  -     XR Ribs Right With PA Chest; Future    3. Generalized " anxiety disorder  -     hydrOXYzine pamoate (VISTARIL) 25 MG capsule; Take 1 capsule by mouth Every 6 (Six) Hours As Needed for Anxiety.  Dispense: 30 capsule; Refill: 1    4. Rib pain on right side  -     XR Ribs Right With PA Chest; Future    5. Closed fracture of nasal bone with routine healing, subsequent encounter  Comments:  Nondisplaced per records. I recommend follow-up with ENT. He has a number from the ER and will call this week.      Problem List Items Addressed This Visit        Mental Health    Generalized anxiety disorder    Overview     Vistaril PRN. Dr. Rust had tried him on Lexapro in February but patient stopped.         Relevant Medications    hydrOXYzine pamoate (VISTARIL) 25 MG capsule   Other Visit Diagnoses     Influenza vaccine needed    -  Primary    Fall, initial encounter        Rib pain on right side        Closed fracture of nasal bone with routine healing, subsequent encounter        Nondisplaced per records. I recommend follow-up with ENT. He has a number from the ER and will call this week.          We will plan to obtain previous records both for chronic preventative care as well as those related to the current episode of care.  Any records that the patient brought with him today were reviewed personally by me during the visit today and will be scanned into the chart for posterity.    Discussed the nature of the disease including relevant anatomy & expected clinical course, risks, complications, implications, management, safe and proper use of medications. Plan of care reviewed with patient at the conclusion of today's visit. Education was provided regarding diagnosis and management.  Patient verbalizes understanding of and agreement with management plan. Encouraged therapeutic lifestyle changes including low calorie diet with plenty of fruits and vegetables, daily exercise, medication compliance, and keeping scheduled follow up appointments with me and any other providers.  Encouraged patient to have appointment for complete physical, fasting labs, appropriate screenings, and immunizations on an annual basis. Discussed extended office hours, shared call, and appropriate use of the ER. Discussed generally we do not prescribe chronic controlled substances from this office. Appropriate referrals will be made to pain management and psychiatry if needed. Stressed the importance and expectation of medical compliance with plan of care, medications, and follow up appointments.    There are no Patient Instructions on file for this visit.    Follow Up:   Return in about 6 months (around 5/22/2023) for Annual.    DO ALONZO Hernandez RD  [facility]  [address] [facility] [address]  Fax [phone]  Phone [phone]      Transcribed from ambient dictation for Homero Sandoval DO by Yessy Holt.  11/22/22   10:26 EST    Patient or patient representative verbalized consent to the visit recording.  I have personally performed the services described in this document as transcribed by the above individual, and it is both accurate and complete.

## 2022-12-19 DIAGNOSIS — F41.1 GENERALIZED ANXIETY DISORDER: ICD-10-CM

## 2022-12-19 DIAGNOSIS — K72.10 END STAGE LIVER DISEASE: ICD-10-CM

## 2022-12-19 NOTE — TELEPHONE ENCOUNTER
Caller: SALIMA COTTER    Relationship: Mother    Best call back number: [phone]    Requested Prescriptions:   Requested Prescriptions     Pending Prescriptions Disp Refills   • spironolactone (ALDACTONE) 100 MG tablet 60 tablet 4     Sig: Take 1 tablet by mouth Daily.   • nadolol (CORGARD) 40 MG tablet 60 tablet 4     Sig: Take 2 tablets by mouth Daily.   • furosemide (LASIX) 20 MG tablet 60 tablet 4     Sig: Take 1 tablet by mouth 2 (Two) Times a Day.   • hydrOXYzine pamoate (VISTARIL) 25 MG capsule 30 capsule 1     Sig: Take 1 capsule by mouth Every 6 (Six) Hours As Needed for Anxiety.        Pharmacy where request should be sent: [facility] [id number] [address] [phone] [facility] [phone] FX     Additional details provided by patient: HE IS COMPLETELY    Does the patient have less than a 3 day supply:  [x] Yes  [] No    Would you like a call back once the refill request has been completed: [x] Yes [] No    If the office needs to give you a call back, can they leave a voicemail: [x] Yes [] No    Joanne Aburto, PCT   12/19/22 15:31 EST

## 2022-12-19 NOTE — TELEPHONE ENCOUNTER
Nadolol, spironolactone, furosemide have not been prescribed by you bfore    Rx Refill Note  Requested Prescriptions     Pending Prescriptions Disp Refills   • spironolactone (ALDACTONE) 100 MG tablet 60 tablet 4     Sig: Take 1 tablet by mouth Daily.   • nadolol (CORGARD) 40 MG tablet 60 tablet 4     Sig: Take 2 tablets by mouth Daily.   • furosemide (LASIX) 20 MG tablet 60 tablet 4     Sig: Take 1 tablet by mouth 2 (Two) Times a Day.   • hydrOXYzine pamoate (VISTARIL) 25 MG capsule 30 capsule 1     Sig: Take 1 capsule by mouth Every 6 (Six) Hours As Needed for Anxiety.      Last office visit with prescribing clinician: 11/22/2022   Last telemedicine visit with prescribing clinician: 5/22/2023   Next office visit with prescribing clinician: 5/22/2023                         Would you like a call back once the refill request has been completed: [] Yes [] No    If the office needs to give you a call back, can they leave a voicemail: [] Yes [] No    Jasmin Montalvo MA  12/19/22, 16:05 EST

## 2022-12-19 NOTE — TELEPHONE ENCOUNTER
----- Message from Mark Anthony Hooper sent at 12/19/2022  3:28 PM EST -----  Regarding: Refill Rx   Contact: [phone]  Need refills sent to [facility] in [place] on Nadalol , Lasix & Spironolactone & Vistaril please. Lost bottles & can't call myself without Rx #

## 2022-12-20 RX ORDER — FUROSEMIDE 20 MG/1
20 TABLET ORAL 2 TIMES DAILY
Qty: 60 TABLET | Refills: 4 | Status: SHIPPED | OUTPATIENT
Start: 2022-12-20 | End: 2022-12-21 | Stop reason: SDUPTHER

## 2022-12-20 RX ORDER — SPIRONOLACTONE 100 MG/1
100 TABLET, FILM COATED ORAL DAILY
Qty: 60 TABLET | Refills: 4 | Status: SHIPPED | OUTPATIENT
Start: 2022-12-20

## 2022-12-20 RX ORDER — HYDROXYZINE PAMOATE 25 MG/1
25 CAPSULE ORAL EVERY 6 HOURS PRN
Qty: 30 CAPSULE | Refills: 1 | Status: SHIPPED | OUTPATIENT
Start: 2022-12-20

## 2022-12-20 RX ORDER — NADOLOL 40 MG/1
80 TABLET ORAL DAILY
Qty: 60 TABLET | Refills: 4 | Status: SHIPPED | OUTPATIENT
Start: 2022-12-20

## 2022-12-21 ENCOUNTER — TELEPHONE (OUTPATIENT)
Dept: FAMILY MEDICINE CLINIC | Facility: CLINIC | Age: 47
End: 2022-12-21

## 2022-12-21 DIAGNOSIS — K72.10 END STAGE LIVER DISEASE: ICD-10-CM

## 2022-12-21 RX ORDER — FUROSEMIDE 20 MG/1
20 TABLET ORAL 2 TIMES DAILY
Qty: 60 TABLET | Refills: 4 | Status: CANCELLED | OUTPATIENT
Start: 2022-12-21

## 2022-12-21 NOTE — TELEPHONE ENCOUNTER
Contacted pharmacist to inquire further, patient received lasix 40 mg from Dr. Holden, GI    I advised her to place PCP's script on hold for further clarification.     Contacted [facility] to discuss further they will continue to treat cirrhosis and will manage this script further.

## 2022-12-21 NOTE — TELEPHONE ENCOUNTER
Pharmacy Name: [facility] [phone] [facility] [phone]      Pharmacy representative name: JHONATHAN    Pharmacy representative phone number: [phone]    What medication are you calling in regards to: furosemide (LASIX) 20 MG tablet    What question does the pharmacy have: RECEIVED A NEW DOSE- PLEASE CALL TO CONFIRM IF PATIENT SHOULD HAVE BOTH DOSES OR IF ONE IS REPLACING THE OTHER    Additional notes: PLEASE CALL TO VERIFY DOSE OF MEDICATION: furosemide (LASIX) 20 MG tablet

## 2023-01-16 RX ORDER — ESCITALOPRAM OXALATE 5 MG/1
5 TABLET ORAL DAILY
Qty: 90 TABLET | Refills: 1 | Status: SHIPPED | OUTPATIENT
Start: 2023-01-16

## 2023-09-13 ENCOUNTER — LAB (OUTPATIENT)
Dept: LAB | Facility: HOSPITAL | Age: 48
End: 2023-09-13
Payer: MEDICAID

## 2023-09-13 DIAGNOSIS — Z13.89 SCREENING FOR HEMATURIA OR PROTEINURIA: Primary | ICD-10-CM

## 2023-09-13 DIAGNOSIS — Z13.89 SCREENING FOR HEMATURIA OR PROTEINURIA: ICD-10-CM

## 2023-09-13 LAB
ALBUMIN UR-MCNC: <1.2 MG/DL
CREAT UR-MCNC: 57.1 MG/DL
MICROALBUMIN/CREAT UR: NORMAL MG/G{CREAT}

## 2023-09-13 PROCEDURE — 82043 UR ALBUMIN QUANTITATIVE: CPT

## 2023-09-13 PROCEDURE — 82570 ASSAY OF URINE CREATININE: CPT

## 2023-12-14 ENCOUNTER — TELEPHONE (OUTPATIENT)
Dept: FAMILY MEDICINE CLINIC | Facility: CLINIC | Age: 48
End: 2023-12-14
Payer: MEDICAID

## 2023-12-14 NOTE — TELEPHONE ENCOUNTER
Caller: SALIMA COTTER    Relationship: Mother    Best call back number: [phone]     What is the medical concern/diagnosis: SORE THROAT, WHITE THROAT    What specialty or service is being requested: ENT    What is the provider, practice or medical service name: [facility] IF POSSIBLE    What is the office location: [facility]

## 2023-12-14 NOTE — TELEPHONE ENCOUNTER
Spoke with patients mother CarriCESAR reviewed. Advised her that we have not seen patient in over 1 year and we will need an appointment with him first for any type of referral. She stated the patient went to the store and when he gets back she will check with him about scheduling an appointment with Dr. Sandoval.

## 2023-12-15 ENCOUNTER — TELEMEDICINE (OUTPATIENT)
Dept: FAMILY MEDICINE CLINIC | Facility: CLINIC | Age: 48
End: 2023-12-15
Payer: MEDICAID

## 2023-12-15 DIAGNOSIS — R49.0 HOARSENESS: Primary | ICD-10-CM

## 2023-12-15 DIAGNOSIS — J03.90 TONSILLITIS: ICD-10-CM

## 2023-12-15 PROCEDURE — 99213 OFFICE O/P EST LOW 20 MIN: CPT | Performed by: FAMILY MEDICINE

## 2023-12-15 NOTE — PROGRESS NOTES
Subjective   Mark Anthony Hooper is a 48 y.o. male.     Chief Complaint   Patient presents with    Follow-up     Req ENT referral       History of Present Illness     Mark Anthony Hooper presents today for   Chief Complaint   Patient presents with    Follow-up     Req ENT referral     Answers submitted by the patient for this visit:  Primary Reason for Visit (Submitted on 12/15/2023)  What is the primary reason for your visit?: Other  Other (Submitted on 12/15/2023)  Please describe your symptoms.: Need ENT referral & having severe leg cramps  Have you had these symptoms before?: Yes  How long have you been having these symptoms?: Greater than 2 weeks  Please list any medications you are currently taking for this condition.: 1/2 10mg baclofen tid  Please describe any probable cause for these symptoms. : 80 mg Lasix qd x 2 days    Had severe leg spasms this morning after taking the higher dose of lasix. Talked with transplant this morning they recommended doing full tab baclofen TID. Ok to go up to 20mg TID.    ENT referral requested    You have chosen to receive care through a telehealth visit.  Do you consent to use a video/audio connection for your medical care today? Yes    Patient location: [address] [place], [place] [address]   Provider Location: [address] [place] [address]    The following portions of the patient's history were reviewed and updated as appropriate: allergies, current medications, past family history, past medical history, past social history, past surgical history and problem list.    Active Ambulatory Problems     Diagnosis Date Noted    Cirrhosis of liver with ascites 09/14/2021    History of intravenous drug abuse 09/14/2021    Secondary esophageal varices without bleeding 09/20/2021    Portal hypertension 09/20/2021    Splenomegaly 09/20/2021    Thrombocytopenia due to hypersplenism 09/20/2021    Other ascites 09/20/2021    Generalized anxiety disorder 11/22/2022     Resolved  Ambulatory Problems     Diagnosis Date Noted    No Resolved Ambulatory Problems     Past Medical History:   Diagnosis Date    ADHD (attention deficit hyperactivity disorder)     Anxiety     Depression     Diabetes mellitus     Erectile dysfunction     Headache     Hypertension     Liver disease     Visual impairment      Past Surgical History:   Procedure Laterality Date    CHOLECYSTECTOMY      GALLBLADDER SURGERY      PARACENTESIS       Family History   Problem Relation Age of Onset    Colon polyps Neg Hx     Colon cancer Neg Hx      Social History     Socioeconomic History    Marital status: Single   Tobacco Use    Smoking status: Former     Packs/day: 1.00     Years: 22.00     Additional pack years: 0.00     Total pack years: 22.00     Types: Cigarettes     Quit date:      Years since quittin.9    Smokeless tobacco: Never   Vaping Use    Vaping Use: Every day    Start date: 2014    Substances: Nicotine, THC    Devices: Pre-filled or refillable cartridge, Refillable tank   Substance and Sexual Activity    Alcohol use: Not Currently     Alcohol/week: 0.0 standard drinks of alcohol    Drug use: Not Currently     Types: Marijuana    Sexual activity: Not Currently       Review of Systems  Review of Systems -  General ROS: negative for - chills, fever or night sweats  Cardiovascular ROS: no chest pain or dyspnea on exertion  Gastrointestinal ROS: no abdominal pain, change in bowel habits, or black or bloody stools  Genito-Urinary ROS: no dysuria, trouble voiding, or hematuria    Objective   There were no vitals taken for this visit.  Vitals obtained from patient if available  Physical Exam  Const: Non-toxic appearing, NAD, A&Ox4, Pleasant, Cooperative  Eyes: EOMI, no conjunctivitis  ENT: No copious nasal drainage noted  Cardiac: Regular rate by pulse  Resp: Respiratory rate observed to be within normal limits, no increased work of breathing observed, no audible wheezing or cough  noted  Psych: Appropriate mood and behavior.  Procedures  Assessment & Plan   Problem List Items Addressed This Visit    None  Visit Diagnoses       Hoarseness    -  Primary    Relevant Orders    Ambulatory Referral to ENT (Otolaryngology)    Tonsillitis        Relevant Orders    Ambulatory Referral to ENT (Otolaryngology)          Tonsillitis/sore throat: Worsened  Hoarseness  - patient reports hoarseness and tonsillitis not improving with azithromycin course. He reports these symptoms for the last 2months  - lot of white spots and erosion on both tonsils on 12/5> now progressed to patchy exudative tonsillitis  - likely fungal infection we will start the patient on fluconazole for a week and him nystatin mouthwash and we will refer for ENT evaluation. Unable to get in with  ENT for months, requested referral to [facility].    See patient diagnoses and orders along with patient instructions for assessment, plan, and changes to care for patient.    This visit was conducted via telemedicine with live video and audio provided through Video Options: MyChart/Zoom at the point of care.    There are no Patient Instructions on file for this visit.    No follow-ups on file.    Ambulatory progress note signed and attested to by Homero Sandoval D.O.

## 2024-02-07 RX ORDER — ESCITALOPRAM OXALATE 5 MG/1
5 TABLET ORAL DAILY
Qty: 90 TABLET | Refills: 1 | OUTPATIENT
Start: 2024-02-07

## 2024-02-19 ENCOUNTER — READMISSION MANAGEMENT (OUTPATIENT)
Dept: CALL CENTER | Facility: HOSPITAL | Age: 49
End: 2024-02-19
Payer: MEDICAID

## 2024-02-20 ENCOUNTER — TRANSITIONAL CARE MANAGEMENT TELEPHONE ENCOUNTER (OUTPATIENT)
Dept: CALL CENTER | Facility: HOSPITAL | Age: 49
End: 2024-02-20
Payer: MEDICAID

## 2024-02-20 NOTE — OUTREACH NOTE
Call Center TCM Note      Flowsheet Row Responses   [facility] patient discharged from? Non-BH   Does the patient have one of the following disease processes/diagnoses(primary or secondary)? Other   TCM attempt successful? No   Unsuccessful attempts Attempt 1   Revoked Reason Other  [Pt is in [facility] in the operating room for transplant surgery.]   Does the patient have an appointment with their PCP within 7-14 days of discharge? No   Nursing Interventions PCP office requested to make appointment - message sent            Keyana Butler RN    2/20/2024, 11:15 EST

## 2024-02-20 NOTE — OUTREACH NOTE
Prep Survey      Flowsheet Row Responses   [facility] facility patient discharged from? Non-BH   Is LACE score < 7 ? Non-BH Discharge   Eligibility [facility]   Date of Admission 02/18/24   Date of Discharge 02/19/24   Discharge diagnosis End stage liver dx   Does the patient have one of the following disease processes/diagnoses(primary or secondary)? Other   Prep survey completed? Yes            SAY HENDRICKS - Registered Nurse

## 2024-03-18 RX ORDER — ESCITALOPRAM OXALATE 5 MG/1
5 TABLET ORAL DAILY
Qty: 90 TABLET | Refills: 1 | Status: SHIPPED | OUTPATIENT
Start: 2024-03-18

## 2024-03-29 ENCOUNTER — READMISSION MANAGEMENT (OUTPATIENT)
Dept: CALL CENTER | Facility: HOSPITAL | Age: 49
End: 2024-03-29
Payer: MEDICAID

## 2024-03-29 NOTE — OUTREACH NOTE
Prep Survey      Flowsheet Row Responses   [facility] facility patient discharged from? Non-BH   Is LACE score < 7 ? Non-BH Discharge   Eligibility [facility]   Date of Admission 03/27/24   Date of Discharge 03/29/24   Discharge diagnosis Decompensated hepatic cirrhosis   Does the patient have one of the following disease processes/diagnoses(primary or secondary)? Other   Prep survey completed? Yes            Kay MOODY - Registered Nurse

## 2024-04-01 ENCOUNTER — TRANSITIONAL CARE MANAGEMENT TELEPHONE ENCOUNTER (OUTPATIENT)
Dept: CALL CENTER | Facility: HOSPITAL | Age: 49
End: 2024-04-01
Payer: MEDICAID

## 2024-04-01 NOTE — OUTREACH NOTE
Call Center TCM Note      Flowsheet Row Responses   [facility] patient discharged from? Non-   Does the patient have one of the following disease processes/diagnoses(primary or secondary)? Other   TCM attempt successful? Yes   Call start time 1521   Call end time 1523   Discharge diagnosis Decompensated hepatic cirrhosis   Meds reviewed with patient/caregiver? Yes   Is the patient taking all medications as directed (includes completed medication regime)? Yes   Does the patient have an appointment with their PCP within 7-14 days of discharge? No   Nursing Interventions Patient declined scheduling/rescheduling appointment at this time   Has home health visited the patient within 72 hours of discharge? N/A   Psychosocial issues? No   What is the patient's perception of their health status since discharge? Improving   Is the patient/caregiver able to teach back signs and symptoms related to disease process for when to call PCP? Yes   Is the patient/caregiver able to teach back signs and symptoms related to disease process for when to call 911? Yes   Is the patient/caregiver able to teach back the hierarchy of who to call/visit for symptoms/problems? PCP, Specialist, Home health nurse, Urgent Care, ED, 911 Yes   If the patient is a current smoker, are they able to teach back resources for cessation? Not a smoker   TCM call completed? Yes   Call end time 1523   Would this patient benefit from a Referral to Amb Social Work? No   Is the patient interested in additional calls from an ambulatory ? No            Elvie Soares LPN    4/1/2024, 15:25 EDT

## 2024-04-01 NOTE — OUTREACH NOTE
Call Center TCM Note      Flowsheet Row Responses   [facility] patient discharged from? Non-BH   Does the patient have one of the following disease processes/diagnoses(primary or secondary)? Other   TCM attempt successful? No   Unsuccessful attempts Attempt 1            Elvie Soares LPN    4/1/2024, 09:03 EDT

## 2024-04-04 ENCOUNTER — READMISSION MANAGEMENT (OUTPATIENT)
Dept: CALL CENTER | Facility: HOSPITAL | Age: 49
End: 2024-04-04
Payer: MEDICAID

## 2024-04-04 NOTE — OUTREACH NOTE
Prep Survey      Flowsheet Row Responses   [facility] facility patient discharged from? Non-BH   Is LACE score < 7 ? Non-BH Discharge   Eligibility [facility]   Date of Admission 04/03/24   Date of Discharge 04/03/24   Discharge Disposition Home or Self Care   Discharge diagnosis Abdominal ascites   Does the patient have one of the following disease processes/diagnoses(primary or secondary)? Other   Prep survey completed? Yes            Inez DEL VALLE - Registered Nurse

## 2024-04-05 ENCOUNTER — TRANSITIONAL CARE MANAGEMENT TELEPHONE ENCOUNTER (OUTPATIENT)
Dept: CALL CENTER | Facility: HOSPITAL | Age: 49
End: 2024-04-05
Payer: MEDICAID

## 2024-04-05 NOTE — OUTREACH NOTE
Call Center TCM Note      Flowsheet Row Responses   [facility] patient discharged from? Non-  []   Does the patient have one of the following disease processes/diagnoses(primary or secondary)? Other   TCM attempt successful? No   Unsuccessful attempts Attempt 1            Barbara Sandoval RN    4/5/2024, 10:51 EDT

## 2024-04-05 NOTE — OUTREACH NOTE
Call Center TCM Note      Flowsheet Row Responses   [facility] patient discharged from? Non-  []   Does the patient have one of the following disease processes/diagnoses(primary or secondary)? Other   TCM attempt successful? Yes   Call start time 1359   Call end time 1400   Discharge diagnosis Abdominal ascites,  Low blood count   Is patient permission given to speak with other caregiver? Yes   List who call center can speak with Mother- Carri   Person spoke with today (if not patient) and relationship Mother   Is the patient taking all medications as directed (includes completed medication regime)? Yes   Does the patient have an appointment with their PCP within 7-14 days of discharge? No   Nursing Interventions Patient declined scheduling/rescheduling appointment at this time, Patient desires to follow up with specialty only   Has home health visited the patient within 72 hours of discharge? N/A   Psychosocial issues? No   What is the patient's perception of their health status since discharge? Improving   Is the patient/caregiver able to teach back signs and symptoms related to disease process for when to call PCP? Yes   Is the patient/caregiver able to teach back signs and symptoms related to disease process for when to call 911? Yes   Is the patient/caregiver able to teach back the hierarchy of who to call/visit for symptoms/problems? PCP, Specialist, Home health nurse, Urgent Care, ED, 911 Yes   TCM call completed? Yes   Wrap up additional comments Per mother, patient is doing well, he will be following up with the transplant team.   Call end time 1400   Would this patient benefit from a Referral to Amb Social Work? No   Is the patient interested in additional calls from an ambulatory ? No            Barbara Sandoval RN    4/5/2024, 14:00 EDT

## 2024-04-17 ENCOUNTER — READMISSION MANAGEMENT (OUTPATIENT)
Dept: CALL CENTER | Facility: HOSPITAL | Age: 49
End: 2024-04-17
Payer: MEDICAID

## 2024-04-18 ENCOUNTER — TRANSITIONAL CARE MANAGEMENT TELEPHONE ENCOUNTER (OUTPATIENT)
Dept: CALL CENTER | Facility: HOSPITAL | Age: 49
End: 2024-04-18
Payer: MEDICAID

## 2024-04-18 NOTE — OUTREACH NOTE
Call Center TCM Note      Flowsheet Row Responses   [facility] [place] by [facility] patient discharged from? Non-BH   Does the patient have one of the following disease processes/diagnoses(primary or secondary)? General Surgery   TCM attempt successful? No  [Mother on release]   Unsuccessful attempts Attempt 1   Call Status Left message            Laurent Tatum RN    4/18/2024, 12:52 EDT

## 2024-04-18 NOTE — OUTREACH NOTE
Prep Survey      Flowsheet Row Responses   [facility] facility patient discharged from? Non-BH   Is LACE score < 7 ? Non-BH Discharge   Eligibility [facility]   Date of Admission 04/16/24   Date of Discharge 04/17/24   Discharge Disposition Home or Self Care   Discharge diagnosis LAVONNE, cirrhosis, S/P TIPS (transjugular intrahepatic portosystemic shunt)   Does the patient have one of the following disease processes/diagnoses(primary or secondary)? General Surgery   Does the patient have Home health ordered? No   Prep survey completed? Yes            Haleigh HAMMER - Registered Nurse

## 2024-04-18 NOTE — OUTREACH NOTE
Call Center TCM Note      Flowsheet Row Responses   [facility] patient discharged from? Non-   Does the patient have one of the following disease processes/diagnoses(primary or secondary)? General Surgery   TCM attempt successful? Yes   Call start time 1322   Call end time 1323   Discharge diagnosis LAVONNE, cirrhosis, S/P TIPS (transjugular intrahepatic portosystemic shunt)   Is patient permission given to speak with other caregiver? Yes   List who call center can speak with Mother- Carri   Person spoke with today (if not patient) and relationship Mother   Meds reviewed with patient/caregiver? Yes   Is the patient having any side effects they believe may be caused by any medication additions or changes? No   Does the patient have all medications ordered at discharge? Yes   Is the patient taking all medications as directed (includes completed medication regime)? Yes   Does the patient have an appointment with their PCP within 7-14 days of discharge? No   Nursing Interventions Patient desires to follow up with specialty only   Psychosocial issues? No   Did the patient receive a copy of their discharge instructions? Yes   Nursing interventions Reviewed instructions with patient   What is the patient's perception of their health status since discharge? Improving   Is the patient/caregiver able to teach back signs and symptoms related to disease process for when to call PCP? Yes   Is the patient/caregiver able to teach back signs and symptoms related to disease process for when to call 911? Yes   Is the patient/caregiver able to teach back the hierarchy of who to call/visit for symptoms/problems? PCP, Specialist, Home health nurse, Urgent Care, ED, 911 Yes   If the patient is a current smoker, are they able to teach back resources for cessation? Not a smoker   TCM call completed? Yes   Wrap up additional comments Per mother, patient is doing well.   Call end time 1323   Would this patient benefit from a Referral to  Amb Social Work? No   Is the patient interested in additional calls from an ambulatory ? No            Laurent Tatum RN    4/18/2024, 13:23 EDT

## 2024-05-19 ENCOUNTER — READMISSION MANAGEMENT (OUTPATIENT)
Dept: CALL CENTER | Facility: HOSPITAL | Age: 49
End: 2024-05-19
Payer: MEDICAID

## 2024-05-19 NOTE — OUTREACH NOTE
Prep Survey      Flowsheet Row Responses   [facility] patient discharged from? Non-BH   Is LACE score < 7 ? Non-BH Discharge   Eligibility [facility]   Date of Admission 05/14/24   Date of Discharge 05/19/24   Discharge Disposition Home or Self Care   Discharge diagnosis anemia   Does the patient have one of the following disease processes/diagnoses(primary or secondary)? Other   Does the patient have Home health ordered? No   Prep survey completed? Yes            Haleigh Cary Registered Nurse

## 2024-05-20 ENCOUNTER — TRANSITIONAL CARE MANAGEMENT TELEPHONE ENCOUNTER (OUTPATIENT)
Dept: CALL CENTER | Facility: HOSPITAL | Age: 49
End: 2024-05-20
Payer: MEDICAID

## 2024-05-20 NOTE — OUTREACH NOTE
"Call Center TCM Note      Flowsheet Row Responses   [facility] patient discharged from? Non-  []   Does the patient have one of the following disease processes/diagnoses(primary or secondary)? Other   TCM attempt successful? Yes   Call start time 1108   Call end time 1112   Discharge diagnosis anemia   Person spoke with today (if not patient) and relationship Mother   What is preventing the patient from taking all medications as directed? Other   Medication comments States they are waiting on his injections medication that he gets 3 times a day.  She is waiting to hear from pharmacy   Comments States he is going to follow up with transplant team   Does the patient have an appointment with their PCP within 7-14 days of discharge? No   Nursing Interventions Patient desires to follow up with specialty only   Psychosocial issues? No   What is the patient's perception of their health status since discharge? Same   Is the patient/caregiver able to teach back signs and symptoms related to disease process for when to call PCP? Yes   Is the patient/caregiver able to teach back signs and symptoms related to disease process for when to call 911? Yes   Is the patient/caregiver able to teach back the hierarchy of who to call/visit for symptoms/problems? PCP, Specialist, Home health nurse, Urgent Care, ED, 911 Yes   If the patient is a current smoker, are they able to teach back resources for cessation? Not a smoker   Additional teach back comments States he is doing about the same.  \"He won't improve til he gets his transplant\".   TCM call completed? Yes   Wrap up additional comments Denies questions or needs at this time.  States he is following up closely with all specialty drs.   Call end time 1112            Adelaida Vegas LPN    5/20/2024, 11:12 EDT        "

## 2025-01-17 ENCOUNTER — TELEPHONE (OUTPATIENT)
Dept: FAMILY MEDICINE CLINIC | Facility: CLINIC | Age: 50
End: 2025-01-17
Payer: MEDICAID

## 2025-01-17 NOTE — TELEPHONE ENCOUNTER
Caller: SALIMA COTTER    Relationship: Mother    Best call back number: [phone]     What is the medical concern/diagnosis: SLEEP    What specialty or service is being requested: PULMONARY    What is the provider, practice or medical service name: RECOMMENDED BY DR SAEZ    What is the office location:     What is the office phone number:     Any additional details: HIS TRANSPLANT TEAM RECOMMENDED THAT HE GET A SLEEP STUDY DONE.

## 2025-01-22 NOTE — TELEPHONE ENCOUNTER
Caller: SALIMA COTTER    Relationship: Mother    Best call back number: [phone]    What is the best time to reach you: ANY TIME    Who are you requesting to speak with (clinical staff, provider,  specific staff member): DR SAEZ    Do you know the name of the person who called: SALIMA    What was the call regarding: SALIMA CALLED AGAIN CHECKING THE STATUS OF REFERRAL FOR PULMONOLOGY. PLEASE CALL SALIMA BACK

## 2025-01-24 ENCOUNTER — OFFICE VISIT (OUTPATIENT)
Dept: FAMILY MEDICINE CLINIC | Facility: CLINIC | Age: 50
End: 2025-01-24
Payer: MEDICAID

## 2025-01-24 VITALS
HEART RATE: 74 BPM | HEIGHT: 71 IN | OXYGEN SATURATION: 96 % | WEIGHT: 233.4 LBS | BODY MASS INDEX: 32.68 KG/M2 | DIASTOLIC BLOOD PRESSURE: 82 MMHG | SYSTOLIC BLOOD PRESSURE: 120 MMHG

## 2025-01-24 DIAGNOSIS — G47.8 NON-RESTORATIVE SLEEP: ICD-10-CM

## 2025-01-24 DIAGNOSIS — F33.1 MODERATE EPISODE OF RECURRENT MAJOR DEPRESSIVE DISORDER: ICD-10-CM

## 2025-01-24 DIAGNOSIS — N52.03 COMBINED ARTERIAL INSUFFICIENCY AND CORPORO-VENOUS OCCLUSIVE ERECTILE DYSFUNCTION: Primary | ICD-10-CM

## 2025-01-24 DIAGNOSIS — R40.0 UNCONTROLLED DAYTIME SOMNOLENCE: ICD-10-CM

## 2025-01-24 PROCEDURE — 99213 OFFICE O/P EST LOW 20 MIN: CPT | Performed by: FAMILY MEDICINE

## 2025-01-24 RX ORDER — FERROUS SULFATE 325(65) MG
325 TABLET, DELAYED RELEASE (ENTERIC COATED) ORAL DAILY
COMMUNITY
Start: 2024-04-04 | End: 2025-04-04

## 2025-01-24 RX ORDER — METHADONE HYDROCHLORIDE 10 MG/ML
60 CONCENTRATE ORAL DAILY
COMMUNITY

## 2025-01-24 RX ORDER — PANTOPRAZOLE SODIUM 40 MG/1
40 TABLET, DELAYED RELEASE ORAL
COMMUNITY
Start: 2024-08-30

## 2025-01-24 RX ORDER — ERGOCALCIFEROL 1.25 MG/1
50000 CAPSULE ORAL
COMMUNITY
Start: 2024-05-01

## 2025-01-24 RX ORDER — OXYCODONE HYDROCHLORIDE 5 MG/1
5 TABLET ORAL EVERY 6 HOURS PRN
COMMUNITY
Start: 2024-09-06

## 2025-01-24 RX ORDER — NALOXONE HCL 8 MG/.1ML
SPRAY NASAL
COMMUNITY
Start: 2024-10-19

## 2025-01-24 RX ORDER — OCTREOTIDE ACETATE 200 UG/ML
100 INJECTION INTRAVENOUS 3 TIMES DAILY
COMMUNITY

## 2025-01-24 RX ORDER — FUROSEMIDE 80 MG/1
TABLET ORAL
COMMUNITY
Start: 2023-12-14

## 2025-01-24 RX ORDER — BUMETANIDE 1 MG/1
1 TABLET ORAL DAILY
COMMUNITY
Start: 2024-11-25 | End: 2025-03-25

## 2025-01-24 RX ORDER — BUPROPION HYDROCHLORIDE 150 MG/1
150 TABLET ORAL DAILY
Qty: 30 TABLET | Refills: 2 | Status: SHIPPED | OUTPATIENT
Start: 2025-01-24

## 2025-01-24 RX ORDER — SERTRALINE HYDROCHLORIDE 25 MG/1
25 TABLET, FILM COATED ORAL DAILY
COMMUNITY
Start: 2024-10-16 | End: 2025-04-14

## 2025-01-24 RX ORDER — TADALAFIL 10 MG/1
10 TABLET ORAL DAILY PRN
Qty: 10 TABLET | Refills: 2 | Status: SHIPPED | OUTPATIENT
Start: 2025-01-24

## 2025-01-24 RX ORDER — BACLOFEN 10 MG/1
10 TABLET ORAL 3 TIMES DAILY
COMMUNITY
Start: 2024-09-04

## 2025-01-24 NOTE — PROGRESS NOTES
Established Patient Office Visit      Patient Name: Mark Anthony Hooper  : 1975   MRN: [id number]   Care Team: Patient Care Team:  Homero Sandoval DO as PCP - General (Family Medicine)    Chief Complaint:    Chief Complaint   Patient presents with    Med Management     PHQ9 and JOHNNY assessment completed.        History of Present Illness: Mark Anthony Hooper is a 49 y.o. male who is here today for chief complaint.      extreme ehaustion & depression  Symptoms are: chronic.   Onset was 1 to 5 years.   Symptoms occur: constantly.  Symptoms include: fatigue and weakness.     He has severe uncontrolled daytime somnolence with nonrestorative sleep.    This patient is accompanied by their self who contributes to the history of their care.    The following portions of the patient's history were reviewed and updated as appropriate: allergies, current medications, past family history, past medical history, past social history, past surgical history and problem list.    Subjective      Review of Systems:   Review of Systems   Constitutional:  Positive for fatigue.   Neurological:  Positive for weakness.    - See HPI    Past Medical History:   Past Medical History:   Diagnosis Date    ADHD (attention deficit hyperactivity disorder)     Anxiety     Depression     Diabetes mellitus     Erectile dysfunction     Headache     Hypertension     Liver disease     Visual impairment        Past Surgical History:   Past Surgical History:   Procedure Laterality Date    CHOLECYSTECTOMY      GALLBLADDER SURGERY      INGUINAL HERNIA REPAIR      Can't get surgery r/t refractory ascites    PARACENTESIS         Family History:   Family History   Problem Relation Age of Onset    Colon polyps Neg Hx     Colon cancer Neg Hx        Social History:   Social History     Socioeconomic History    Marital status: Single   Tobacco Use    Smoking status: Former     Current packs/day: 0.00     Average packs/day: 1 pack/day for  22.0 years (22.0 ttl pk-yrs)     Types: Cigarettes     Start date: 1993     Quit date: 2015     Years since quitting: 10.0    Smokeless tobacco: Never   Vaping Use    Vaping status: Every Day    Start date: 1/1/2014    Substances: Nicotine, THC    Devices: Pre-filled or refillable cartridge, Refillable tank   Substance and Sexual Activity    Alcohol use: Not Currently    Drug use: Not Currently     Types: Marijuana    Sexual activity: Not Currently       Tobacco History:   Social History     Tobacco Use   Smoking Status Former    Current packs/day: 0.00    Average packs/day: 1 pack/day for 22.0 years (22.0 ttl pk-yrs)    Types: Cigarettes    Start date: 1993    Quit date: 2015    Years since quitting: 10.0   Smokeless Tobacco Never       Medications:   Outpatient Medications Prior to Visit   Medication Sig Dispense Refill    ergocalciferol (ERGOCALCIFEROL) 1.25 MG (94235 UT) capsule Take 1 capsule by mouth.      baclofen (LIORESAL) 10 MG tablet Take 1 tablet by mouth 3 (Three) Times a Day. (Patient not taking: Reported on 1/24/2025)      bumetanide (BUMEX) 1 MG tablet Take 1 tablet by mouth Daily. (Patient not taking: Reported on 1/24/2025)      escitalopram (LEXAPRO) 5 MG tablet TAKE ONE TABLET BY MOUTH DAILY (Patient not taking: Reported on 1/24/2025) 90 tablet 1    ferrous sulfate 325 (65 FE) MG EC tablet Take 1 tablet by mouth Daily. (Patient not taking: Reported on 1/24/2025)      furosemide (Lasix) 80 MG tablet  (Patient not taking: Reported on 1/24/2025)      hydrOXYzine pamoate (VISTARIL) 25 MG capsule Take 1 capsule by mouth Every 6 (Six) Hours As Needed for Anxiety. (Patient not taking: Reported on 1/24/2025) 30 capsule 1    Kloxxado 8 MG/0.1ML liquid  (Patient not taking: Reported on 1/24/2025)      Magnesium Oxide -Mg Supplement (Mag-200) 200 MG tablet  (Patient not taking: Reported on 1/24/2025)      methadone (DOLOPHINE) 10 MG/ML solution Take 6 mL by mouth Daily. (Patient not taking: Reported on  "1/24/2025)      nadolol (CORGARD) 40 MG tablet Take 2 tablets by mouth Daily. (Patient not taking: Reported on 1/24/2025) 60 tablet 4    Nutritional Supplements (Ensure High Protein) liquid Take 1 bottle by mouth 2 (two) times a day. (Patient not taking: Reported on 1/24/2025) 60 each 3    octreotide (sandoSTATIN) 200 MCG/ML injection Infuse 0.5 mL into a venous catheter 3 (Three) Times a Day. (Patient not taking: Reported on 1/24/2025)      oxyCODONE (ROXICODONE) 5 MG immediate release tablet Take 1 tablet by mouth Every 6 (Six) Hours As Needed. (Patient not taking: Reported on 1/24/2025)      pantoprazole (PROTONIX) 40 MG EC tablet Take 1 tablet by mouth. (Patient not taking: Reported on 1/24/2025)      sertraline (ZOLOFT) 25 MG tablet Take 1 tablet by mouth Daily. (Patient not taking: Reported on 1/24/2025)      spironolactone (ALDACTONE) 100 MG tablet Take 1 tablet by mouth Daily. (Patient not taking: Reported on 1/24/2025) 60 tablet 4     No facility-administered medications prior to visit.        Allergies:   No Known Allergies    Objective   Objective     Physical Exam:  Vital Signs:   Vitals:    01/24/25 1107   BP: 120/82   Pulse: 74   SpO2: 96%   Weight: 106 kg (233 lb 6.4 oz)   Height: 181.6 cm (71.5\")     Body mass index is 32.1 kg/m².     Physical Exam  Nursing note reviewed  Const: NAD, A&Ox4, Pleasant, Cooperative  Eyes: EOMI, no conjunctivitis  ENT: No nasal discharge present, neck supple  Cardiac: Regular rate and rhythm, no cyanosis  Resp: Respiratory rate within normal limits, no increased work of breathing, no audible wheezing or retractions noted  GI: No distention or ascites  MSK: Motor and sensation grossly intact in bilateral upper extremities  Neurologic: CN II-XII grossly intact  Psych: Appropriate mood and behavior.  Skin: Warm, dry  Procedures/Radiology     Procedures  No radiology results for the last 7 days     Assessment & Plan   Assessment / Plan      Assessment/Plan:   Problems " Addressed This Visit  Diagnoses and all orders for this visit:    1. Combined arterial insufficiency and corporo-venous occlusive erectile dysfunction (Primary)  -     tadalafil (Cialis) 10 MG tablet; Take 1 tablet by mouth Daily As Needed for Erectile Dysfunction.  Dispense: 10 tablet; Refill: 2    2. Uncontrolled daytime somnolence  -     Home Sleep Study; Future    3. Non-restorative sleep  -     Home Sleep Study; Future    4. Moderate episode of recurrent major depressive disorder  -     buPROPion XL (Wellbutrin XL) 150 MG 24 hr tablet; Take 1 tablet by mouth Daily.  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    Follow Up:   Return in about 3 months (around 4/24/2025) for Annual, (with PHQ9);.      DO ALONZO Hernandez RD  [facility]  [address] [facility] [address]  Fax [phone]  Phone [phone]    Disclaimer to patients: The 21st Century Cares Act makes medical notes like these available to patients in the interest of transparency. However, please be advised that this is still a medical document. It is intended as zrqn-oh-jdkj communication. Many sections may include medical language or jargon, abbreviations, and additional verbiage that are unfamiliar or  confusing. In some ways it may come across as blunt, direct, or may be summarized in order to clearly and concisely communicate the most crucial information to medical professionals. It may also include mentions of conditions that are unlikely but considered as part of the differential diagnosis, including serious disorders. These are not always discussed at length at the time of appointment because their likelihood is so low, but may be included in a medical note to make it clear what has been considered and/or ruled out as part of a work-up. Medical documents are intended to carry relevant information, facts as evident, and the personal clinical opinion of the physician. If you have any questions regarding this medical document, please bring them to the attention of the physician at your next scheduled appointment.

## 2025-01-29 ENCOUNTER — TELEPHONE (OUTPATIENT)
Dept: FAMILY MEDICINE CLINIC | Facility: CLINIC | Age: 50
End: 2025-01-29

## 2025-01-29 NOTE — TELEPHONE ENCOUNTER
PER SLEEP STUDY REFERRAL NOTES:    ACTION NEEDED: please add additional signs and symptoms of anthony such as witnessed apneas, excessive snoring, excessive sleepiness that interfers with daily activity, morning headaches, etc.     ** ENCOUNTER NEEDS TO BE SIGNED AND SIGNS AND SYMPTOMS OF ANTHONY NEED TO BE ADDED     PLEASE ADVISE.

## 2025-01-31 ENCOUNTER — TELEPHONE (OUTPATIENT)
Dept: FAMILY MEDICINE CLINIC | Facility: CLINIC | Age: 50
End: 2025-01-31
Payer: MEDICAID

## 2025-01-31 NOTE — TELEPHONE ENCOUNTER
Caller: SALIMA COTTER    Relationship: Mother    Best call back number: [phone]     What is the best time to reach you: ANY    Who are you requesting to speak with (clinical staff, provider,  specific staff member): CLINICAL STAFF    Do you know the name of the person who called: SELF    What was the call regarding: PATIENT IS REQUESTING A CALLBACK ON THE STATUS OF HIS REFERRAL FOR A SLEEP STUDY.

## 2025-03-18 ENCOUNTER — HOSPITAL ENCOUNTER (OUTPATIENT)
Dept: SLEEP MEDICINE | Facility: HOSPITAL | Age: 50
Discharge: HOME | End: 2025-03-18
Admitting: FAMILY MEDICINE
Payer: MEDICAID

## 2025-03-18 VITALS — WEIGHT: 233 LBS | BODY MASS INDEX: 32.62 KG/M2 | HEIGHT: 71 IN

## 2025-03-18 DIAGNOSIS — R40.0 UNCONTROLLED DAYTIME SOMNOLENCE: ICD-10-CM

## 2025-03-18 DIAGNOSIS — G47.8 NON-RESTORATIVE SLEEP: ICD-10-CM

## 2025-03-18 PROCEDURE — G0399 HOME SLEEP TEST/TYPE 3 PORTA: HCPCS

## 2025-04-06 DIAGNOSIS — F33.1 MODERATE EPISODE OF RECURRENT MAJOR DEPRESSIVE DISORDER: ICD-10-CM

## 2025-04-07 RX ORDER — BUPROPION HYDROCHLORIDE 150 MG/1
150 TABLET ORAL DAILY
Qty: 30 TABLET | Refills: 2 | Status: SHIPPED | OUTPATIENT
Start: 2025-04-07

## 2025-04-07 NOTE — TELEPHONE ENCOUNTER
Rx Refill Note  Requested Prescriptions     Pending Prescriptions Disp Refills    buPROPion XL (WELLBUTRIN XL) 150 MG 24 hr tablet [Pharmacy Med Name: buPROPion HCL  MG TABLET] 30 tablet 2     Sig: TAKE 1 TABLET BY MOUTH DAILY      Last office visit with prescribing clinician: 1/24/2025   Last telemedicine visit with prescribing clinician: Visit date not found   Next office visit with prescribing clinician: 4/24/2025       Mary Perea MA  04/07/25, 13:29 EDT

## 2025-04-25 DIAGNOSIS — N52.03 COMBINED ARTERIAL INSUFFICIENCY AND CORPORO-VENOUS OCCLUSIVE ERECTILE DYSFUNCTION: ICD-10-CM

## 2025-04-28 RX ORDER — TADALAFIL 10 MG/1
10 TABLET ORAL DAILY PRN
Qty: 10 TABLET | Refills: 2 | Status: SHIPPED | OUTPATIENT
Start: 2025-04-28

## 2025-07-23 DIAGNOSIS — F33.1 MODERATE EPISODE OF RECURRENT MAJOR DEPRESSIVE DISORDER: ICD-10-CM

## 2025-07-23 RX ORDER — BUPROPION HYDROCHLORIDE 150 MG/1
150 TABLET ORAL DAILY
Qty: 90 TABLET | Refills: 0 | Status: SHIPPED | OUTPATIENT
Start: 2025-07-23

## 2025-07-23 NOTE — TELEPHONE ENCOUNTER
Rx Refill Note  Requested Prescriptions     Pending Prescriptions Disp Refills    buPROPion XL (WELLBUTRIN XL) 150 MG 24 hr tablet [Pharmacy Med Name: buPROPion HCL  MG TABLET] 90 tablet      Sig: TAKE 1 TABLET BY MOUTH DAILY      Last office visit with prescribing clinician: 1/24/2025   Last telemedicine visit with prescribing clinician: Visit date not found   Next office visit with prescribing clinician: Visit date not found                         Would you like a call back once the refill request has been completed: [] Yes [] No    If the office needs to give you a call back, can they leave a voicemail: [] Yes [] No    Mary Perea MA  07/23/25, 12:59 EDT